# Patient Record
Sex: MALE | Race: WHITE | NOT HISPANIC OR LATINO | Employment: STUDENT | URBAN - METROPOLITAN AREA
[De-identification: names, ages, dates, MRNs, and addresses within clinical notes are randomized per-mention and may not be internally consistent; named-entity substitution may affect disease eponyms.]

---

## 2019-08-08 ENCOUNTER — HOSPITAL ENCOUNTER (OUTPATIENT)
Dept: CARDIOLOGY | Facility: MEDICAL CENTER | Age: 19
End: 2019-08-08
Attending: FAMILY MEDICINE
Payer: COMMERCIAL

## 2019-08-08 DIAGNOSIS — I47.10 SVT (SUPRAVENTRICULAR TACHYCARDIA): ICD-10-CM

## 2019-08-08 LAB
LV EJECT FRACT  99904: 65
LV EJECT FRACT MOD 2C 99903: 72.25
LV EJECT FRACT MOD 4C 99902: 64.3
LV EJECT FRACT MOD BP 99901: 67.08

## 2019-08-08 PROCEDURE — 93306 TTE W/DOPPLER COMPLETE: CPT

## 2019-08-08 PROCEDURE — 93306 TTE W/DOPPLER COMPLETE: CPT | Mod: 26 | Performed by: INTERNAL MEDICINE

## 2019-08-27 ENCOUNTER — OFFICE VISIT (OUTPATIENT)
Dept: CARDIOLOGY | Facility: MEDICAL CENTER | Age: 19
End: 2019-08-27
Payer: COMMERCIAL

## 2019-08-27 ENCOUNTER — TELEPHONE (OUTPATIENT)
Dept: CARDIOLOGY | Facility: MEDICAL CENTER | Age: 19
End: 2019-08-27

## 2019-08-27 VITALS
WEIGHT: 216 LBS | DIASTOLIC BLOOD PRESSURE: 82 MMHG | HEART RATE: 68 BPM | HEIGHT: 70 IN | BODY MASS INDEX: 30.92 KG/M2 | SYSTOLIC BLOOD PRESSURE: 122 MMHG | OXYGEN SATURATION: 98 %

## 2019-08-27 DIAGNOSIS — I47.10 SVT (SUPRAVENTRICULAR TACHYCARDIA): ICD-10-CM

## 2019-08-27 LAB — EKG IMPRESSION: NORMAL

## 2019-08-27 PROCEDURE — 99204 OFFICE O/P NEW MOD 45 MIN: CPT | Performed by: INTERNAL MEDICINE

## 2019-08-27 PROCEDURE — 93000 ELECTROCARDIOGRAM COMPLETE: CPT | Performed by: INTERNAL MEDICINE

## 2019-08-27 RX ORDER — AMOXICILLIN 500 MG/1
500 CAPSULE ORAL
Refills: 0 | COMMUNITY
Start: 2019-07-24 | End: 2019-08-27

## 2019-08-27 ASSESSMENT — ENCOUNTER SYMPTOMS
PALPITATIONS: 1
ABDOMINAL PAIN: 0
CHILLS: 0
COUGH: 0
VOMITING: 0
FEVER: 0
BLURRED VISION: 0
EYE DISCHARGE: 0
NERVOUS/ANXIOUS: 0
HEMOPTYSIS: 0
LOSS OF CONSCIOUSNESS: 0
EYE PAIN: 0
DEPRESSION: 0
BRUISES/BLEEDS EASILY: 0
SPEECH CHANGE: 0
WHEEZING: 0
NAUSEA: 0
MYALGIAS: 0

## 2019-08-27 NOTE — TELEPHONE ENCOUNTER
Dr Blake called. He thought pt was scheduled for procedure with EUGENIO Dominguez at [facility] today. Called [facility] and [facility] cath lab, pt is not on schedule, called [facility] card pt is not a pt in their system and has not been seen per the . Per Dr Blake, keep pt scheduled as is here for procedure.

## 2019-08-27 NOTE — PROGRESS NOTES
Chief Complaint   Patient presents with   • Supraventricular Tachycardia (SVT)     PP DX:SVT       Subjective:   Don Alejo is a 19 y.o. male who presents today seen in consult request of Dr Blake secondary to recurrent SVT 2 episodes in the past week.  Both occurring while practicing.  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  No family history of heart disease.  Normal echocardiogram.  No syncope.  Had rapid heart rate without exertion.  Did occur or start with exertion.  Does not want to take medications.  No episodes as a child.  EKG with possible mild preexcitation seen inferiorly.  Although not definitive.  No family history of sudden cardiac death or heart disease.  One sibling healthy patient is accompanied by his mother.  Currently on not on medications.  EKGs in the media.  Initial one broke with carotid sinus massage.  Did not have to go to the hospital.  No illicit drug use.  No smoking no alcohol.  Patient majoring in [employer] science    No past medical history on file.  No past surgical history on file.  No family history on file.  Social History     Socioeconomic History   • Marital status: Single     Spouse name: Not on file   • Number of children: Not on file   • Years of education: Not on file   • Highest education level: Not on file   Occupational History   • Not on file   Social Needs   • Financial resource strain: Not on file   • Food insecurity:     Worry: Not on file     Inability: Not on file   • Transportation needs:     Medical: Not on file     Non-medical: Not on file   Tobacco Use   • Smoking status: Not on file   Substance and Sexual Activity   • Alcohol use: Not on file   • Drug use: Not on file   • Sexual activity: Not on file   Lifestyle   • Physical activity:     Days per week: Not on file     Minutes per session: Not on file   • Stress: Not on file   Relationships   • Social connections:     Talks on phone: Not on file     Gets together: Not on file     Attends [employer]  "service: Not on file     Active member of club or organization: Not on file     Attends meetings of clubs or organizations: Not on file     Relationship status: Not on file   • Intimate partner violence:     Fear of current or ex partner: Not on file     Emotionally abused: Not on file     Physically abused: Not on file     Forced sexual activity: Not on file   Other Topics Concern   • Not on file   Social History Narrative   • Not on file     No Known Allergies  Outpatient Encounter Medications as of 8/27/2019   Medication Sig Dispense Refill   • [DISCONTINUED] amoxicillin (AMOXIL) 500 MG Cap Take 500 mg by mouth.  0     No facility-administered encounter medications on file as of 8/27/2019.      Review of Systems   Constitutional: Negative for chills and fever.   HENT: Negative for congestion.    Eyes: Negative for blurred vision, pain and discharge.   Respiratory: Negative for cough, hemoptysis and wheezing.    Cardiovascular: Positive for palpitations. Negative for chest pain.   Gastrointestinal: Negative for abdominal pain, nausea and vomiting.   Musculoskeletal: Negative for joint pain and myalgias.   Skin: Negative for itching and rash.   Neurological: Negative for speech change and loss of consciousness.   Endo/Heme/Allergies: Does not bruise/bleed easily.   Psychiatric/Behavioral: Negative for depression. The patient is not nervous/anxious.    All other systems reviewed and are negative.       Objective:   /82 (BP Location: Left arm, Patient Position: Sitting, BP Cuff Size: Adult)   Pulse 68   Ht 1.778 m (5' 10\")   Wt 98 kg (216 lb)   SpO2 98%   BMI 30.99 kg/m²     Physical Exam   Constitutional: He is oriented to person, place, and time. He appears well-developed and well-nourished.   HENT:   Head: Normocephalic and atraumatic.   Eyes: EOM are normal.   Neck: Normal range of motion. Neck supple.   Cardiovascular: Normal rate, regular rhythm and intact distal pulses. Exam reveals no gallop and no " friction rub.   No murmur heard.  Pulmonary/Chest: Effort normal and breath sounds normal.   Abdominal: Soft.   Musculoskeletal: Normal range of motion. He exhibits no edema.   Neurological: He is alert and oriented to person, place, and time.   Skin: Skin is warm and dry.   Psychiatric: He has a normal mood and affect. His behavior is normal. Judgment and thought content normal.       Assessment:     1. SVT (supraventricular tachycardia) ([facility])  EKG       Medical Decision Making:  Today's Assessment / Status / Plan:   1.  Recurrent SVT.  Not interested in medications.  Interested in ablation.  We will try to get this scheduled next week.  2.  Previous ACL repair.  The risks, benefits, and alternatives to SVT ablation were discussed in great detail. Specific risks mentioned include bleeding, infection, arteriovenous fistula related to sheath placement, cardiac perforation with possible tamponade requiring pericardiocentesis or possibly open heart surgery. In addition, pneumothorax and hemothorax were mentioned with right internal jugular venous access. I discussed the remote possibility of permanent pacemaker placement due to to inadvertent AV block. Lastly the risk of stroke was mentioned should left atrial ablation be required; in addition the risk of death and myocardial infarction were discussed. The patient vebalized understanding of these potential complications and wishes to proceed with the procedure. Success rates quoted for AVNRT at approximately 90%, WPW at 90%, atrial tachycardia at 85%.

## 2019-08-28 DIAGNOSIS — Z01.810 PRE-OPERATIVE CARDIOVASCULAR EXAMINATION: ICD-10-CM

## 2019-08-28 DIAGNOSIS — Z01.812 PRE-OPERATIVE LABORATORY EXAMINATION: ICD-10-CM

## 2019-08-28 LAB
ALBUMIN SERPL BCP-MCNC: 4.3 G/DL (ref 3.2–4.9)
ALBUMIN/GLOB SERPL: 1.4 G/DL
ALP SERPL-CCNC: 86 U/L (ref 30–99)
ALT SERPL-CCNC: 41 U/L (ref 2–50)
ANION GAP SERPL CALC-SCNC: 7 MMOL/L (ref 0–11.9)
AST SERPL-CCNC: 36 U/L (ref 12–45)
BASOPHILS # BLD AUTO: 0.4 % (ref 0–1.8)
BASOPHILS # BLD: 0.02 K/UL (ref 0–0.12)
BILIRUB SERPL-MCNC: 0.5 MG/DL (ref 0.1–1.5)
BUN SERPL-MCNC: 14 MG/DL (ref 8–22)
CALCIUM SERPL-MCNC: 9.8 MG/DL (ref 8.5–10.5)
CHLORIDE SERPL-SCNC: 103 MMOL/L (ref 96–112)
CO2 SERPL-SCNC: 27 MMOL/L (ref 20–33)
CREAT SERPL-MCNC: 1.21 MG/DL (ref 0.5–1.4)
EKG IMPRESSION: NORMAL
EOSINOPHIL # BLD AUTO: 0.06 K/UL (ref 0–0.51)
EOSINOPHIL NFR BLD: 1.1 % (ref 0–6.9)
ERYTHROCYTE [DISTWIDTH] IN BLOOD BY AUTOMATED COUNT: 43.8 FL (ref 35.9–50)
GLOBULIN SER CALC-MCNC: 3 G/DL (ref 1.9–3.5)
GLUCOSE SERPL-MCNC: 84 MG/DL (ref 65–99)
HCT VFR BLD AUTO: 49.3 % (ref 42–52)
HGB BLD-MCNC: 15.7 G/DL (ref 14–18)
IMM GRANULOCYTES # BLD AUTO: 0.02 K/UL (ref 0–0.11)
IMM GRANULOCYTES NFR BLD AUTO: 0.4 % (ref 0–0.9)
INR PPP: 0.99 (ref 0.87–1.13)
LYMPHOCYTES # BLD AUTO: 1.84 K/UL (ref 1–4.8)
LYMPHOCYTES NFR BLD: 33 % (ref 22–41)
MCH RBC QN AUTO: 26.6 PG (ref 27–33)
MCHC RBC AUTO-ENTMCNC: 31.8 G/DL (ref 33.7–35.3)
MCV RBC AUTO: 83.4 FL (ref 81.4–97.8)
MONOCYTES # BLD AUTO: 0.59 K/UL (ref 0–0.85)
MONOCYTES NFR BLD AUTO: 10.6 % (ref 0–13.4)
NEUTROPHILS # BLD AUTO: 3.04 K/UL (ref 1.82–7.42)
NEUTROPHILS NFR BLD: 54.5 % (ref 44–72)
NRBC # BLD AUTO: 0 K/UL
NRBC BLD-RTO: 0 /100 WBC
PLATELET # BLD AUTO: 261 K/UL (ref 164–446)
PMV BLD AUTO: 9.5 FL (ref 9–12.9)
POTASSIUM SERPL-SCNC: 4.3 MMOL/L (ref 3.6–5.5)
PROT SERPL-MCNC: 7.3 G/DL (ref 6–8.2)
PROTHROMBIN TIME: 13.3 SEC (ref 12–14.6)
RBC # BLD AUTO: 5.91 M/UL (ref 4.7–6.1)
SODIUM SERPL-SCNC: 137 MMOL/L (ref 135–145)
TSH SERPL DL<=0.005 MIU/L-ACNC: 1.21 UIU/ML (ref 0.38–5.33)
WBC # BLD AUTO: 5.6 K/UL (ref 4.8–10.8)

## 2019-08-28 PROCEDURE — 84443 ASSAY THYROID STIM HORMONE: CPT

## 2019-08-28 PROCEDURE — 85610 PROTHROMBIN TIME: CPT

## 2019-08-28 PROCEDURE — 36415 COLL VENOUS BLD VENIPUNCTURE: CPT

## 2019-08-28 PROCEDURE — 85025 COMPLETE CBC W/AUTO DIFF WBC: CPT

## 2019-08-28 PROCEDURE — 93005 ELECTROCARDIOGRAM TRACING: CPT | Performed by: INTERNAL MEDICINE

## 2019-08-28 PROCEDURE — 80053 COMPREHEN METABOLIC PANEL: CPT

## 2019-08-28 RX ORDER — IBUPROFEN 200 MG
400 TABLET ORAL
COMMUNITY

## 2019-08-29 ENCOUNTER — APPOINTMENT (OUTPATIENT)
Dept: CARDIOLOGY | Facility: MEDICAL CENTER | Age: 19
End: 2019-08-29
Attending: INTERNAL MEDICINE
Payer: COMMERCIAL

## 2019-08-29 ENCOUNTER — HOSPITAL ENCOUNTER (OUTPATIENT)
Facility: MEDICAL CENTER | Age: 19
End: 2019-08-29
Attending: INTERNAL MEDICINE | Admitting: INTERNAL MEDICINE
Payer: COMMERCIAL

## 2019-08-29 VITALS
WEIGHT: 217.59 LBS | RESPIRATION RATE: 15 BRPM | TEMPERATURE: 97.1 F | HEART RATE: 60 BPM | DIASTOLIC BLOOD PRESSURE: 87 MMHG | OXYGEN SATURATION: 97 % | BODY MASS INDEX: 31.15 KG/M2 | SYSTOLIC BLOOD PRESSURE: 159 MMHG | HEIGHT: 70 IN

## 2019-08-29 DIAGNOSIS — I47.10 SVT (SUPRAVENTRICULAR TACHYCARDIA): ICD-10-CM

## 2019-08-29 LAB — EKG IMPRESSION: NORMAL

## 2019-08-29 PROCEDURE — 700101 HCHG RX REV CODE 250

## 2019-08-29 PROCEDURE — 93613 INTRACARDIAC EPHYS 3D MAPG: CPT | Performed by: INTERNAL MEDICINE

## 2019-08-29 PROCEDURE — 93621 COMP EP EVL L PAC&REC C SINS: CPT | Mod: 26 | Performed by: INTERNAL MEDICINE

## 2019-08-29 PROCEDURE — 93010 ELECTROCARDIOGRAM REPORT: CPT | Performed by: INTERNAL MEDICINE

## 2019-08-29 PROCEDURE — 700111 HCHG RX REV CODE 636 W/ 250 OVERRIDE (IP)

## 2019-08-29 PROCEDURE — 93005 ELECTROCARDIOGRAM TRACING: CPT | Performed by: INTERNAL MEDICINE

## 2019-08-29 PROCEDURE — 93623 PRGRMD STIMJ&PACG IV RX NFS: CPT | Mod: 26 | Performed by: INTERNAL MEDICINE

## 2019-08-29 PROCEDURE — 160002 HCHG RECOVERY MINUTES (STAT)

## 2019-08-29 PROCEDURE — 93653 COMPRE EP EVAL TX SVT: CPT | Performed by: INTERNAL MEDICINE

## 2019-08-29 PROCEDURE — 99153 MOD SED SAME PHYS/QHP EA: CPT

## 2019-08-29 PROCEDURE — 99152 MOD SED SAME PHYS/QHP 5/>YRS: CPT | Performed by: INTERNAL MEDICINE

## 2019-08-29 RX ORDER — HEPARIN SODIUM 200 [USP'U]/100ML
INJECTION, SOLUTION INTRAVENOUS
Status: COMPLETED
Start: 2019-08-29 | End: 2019-08-29

## 2019-08-29 RX ORDER — MIDAZOLAM HYDROCHLORIDE 1 MG/ML
INJECTION INTRAMUSCULAR; INTRAVENOUS
Status: COMPLETED
Start: 2019-08-29 | End: 2019-08-29

## 2019-08-29 RX ORDER — HEPARIN SODIUM,PORCINE 1000/ML
VIAL (ML) INJECTION
Status: COMPLETED
Start: 2019-08-29 | End: 2019-08-29

## 2019-08-29 RX ORDER — DIPHENHYDRAMINE HYDROCHLORIDE 50 MG/ML
INJECTION INTRAMUSCULAR; INTRAVENOUS
Status: COMPLETED
Start: 2019-08-29 | End: 2019-08-29

## 2019-08-29 RX ORDER — LIDOCAINE HYDROCHLORIDE 20 MG/ML
INJECTION, SOLUTION INFILTRATION; PERINEURAL
Status: COMPLETED
Start: 2019-08-29 | End: 2019-08-29

## 2019-08-29 RX ORDER — ISOPROTERENOL HYDROCHLORIDE 0.2 MG/ML
INJECTION, SOLUTION INTRAVENOUS
Status: COMPLETED
Start: 2019-08-29 | End: 2019-08-29

## 2019-08-29 RX ORDER — ACETAMINOPHEN 325 MG/1
650 TABLET ORAL EVERY 4 HOURS PRN
Status: DISCONTINUED | OUTPATIENT
Start: 2019-08-29 | End: 2019-08-29 | Stop reason: HOSPADM

## 2019-08-29 RX ADMIN — ISOPROTERENOL HYDROCHLORIDE 200 MCG: 0.2 INJECTION, SOLUTION INTRACARDIAC; INTRAMUSCULAR; INTRAVENOUS; SUBCUTANEOUS at 08:57

## 2019-08-29 RX ADMIN — MIDAZOLAM HYDROCHLORIDE 4 MG: 1 INJECTION, SOLUTION INTRAMUSCULAR; INTRAVENOUS at 08:46

## 2019-08-29 RX ADMIN — FENTANYL CITRATE 100 MCG: 50 INJECTION, SOLUTION INTRAMUSCULAR; INTRAVENOUS at 10:01

## 2019-08-29 RX ADMIN — HEPARIN SODIUM 4000 UNITS: 1000 INJECTION, SOLUTION INTRAVENOUS; SUBCUTANEOUS at 08:32

## 2019-08-29 RX ADMIN — LIDOCAINE HYDROCHLORIDE: 20 INJECTION, SOLUTION INFILTRATION; PERINEURAL at 08:15

## 2019-08-29 RX ADMIN — MIDAZOLAM HYDROCHLORIDE 2 MG: 1 INJECTION, SOLUTION INTRAMUSCULAR; INTRAVENOUS at 10:01

## 2019-08-29 RX ADMIN — FENTANYL CITRATE 100 MCG: 50 INJECTION, SOLUTION INTRAMUSCULAR; INTRAVENOUS at 08:46

## 2019-08-29 NOTE — DISCHARGE INSTRUCTIONS
ACTIVITY: Rest and take it easy for the first 24 hours.  A responsible adult is recommended to remain with you during that time.  It is normal to feel sleepy.  We encourage you to not do anything that requires balance, judgment or coordination.    MILD FLU-LIKE SYMPTOMS ARE NORMAL. YOU MAY EXPERIENCE GENERALIZED MUSCLE ACHES, THROAT IRRITATION, HEADACHE AND/OR SOME NAUSEA.    FOR 24 HOURS DO NOT:  Drive, operate machinery or run household appliances.  Drink beer or alcoholic beverages.   Make important decisions or sign legal documents.    SPECIAL INSTRUCTIONS: follow up with primary care physician as needed  If you experience chest pain, shortness of breath call 911  Return to ER  Follow up with primary care physician as needed.    DIET: To avoid nausea, slowly advance diet as tolerated, avoiding spicy or greasy foods for the first day.  Add more substantial food to your diet according to your physician's instructions.  Babies can be fed formula or breast milk as soon as they are hungry.  INCREASE FLUIDS AND FIBER TO AVOID CONSTIPATION.    SURGICAL DRESSING/BATHING: keep dressing clean dry intact for 24 hours, remove dressing after 24 hours.     FOLLOW-UP APPOINTMENT:  A follow-up appointment should be arranged with your doctor in [phone]; call to schedule.    You should CALL YOUR PHYSICIAN if you develop:  Fever greater than 101 degrees F.  Pain not relieved by medication, or persistent nausea or vomiting.  Excessive bleeding (blood soaking through dressing) or unexpected drainage from the wound.  Extreme redness or swelling around the incision site, drainage of pus or foul smelling drainage.  Inability to urinate or empty your bladder within 8 hours.  Problems with breathing or chest pain.    You should call 911 if you develop problems with breathing or chest pain.  If you are unable to contact your doctor or surgical center, you should go to the nearest emergency room or urgent care center.  Physician's  telephone #: [phone]    If any questions arise, call your doctor.  If your doctor is not available, please feel free to call the Surgical Center at [phone].  The Center is open Monday through Friday from 7AM to 7PM.  You can also call the HEALTH HOTLINE open 24 hours/day, 7 days/week and speak to a nurse at [phone], or toll free at [phone].    A registered nurse may call you a few days after your surgery to see how you are doing after your procedure.    MEDICATIONS: Resume taking daily medication.  Take prescribed pain medication with food.  If no medication is prescribed, you may take non-aspirin pain medication if needed.  PAIN MEDICATION CAN BE VERY CONSTIPATING.  Take a stool softener or laxative such as senokot, pericolace, or milk of magnesia if needed.    If your physician has prescribed pain medication that includes Acetaminophen (Tylenol), do not take additional Acetaminophen (Tylenol) while taking the prescribed medication.    Depression / Suicide Risk    As you are discharged from this [facility] facility, it is important to learn how to keep safe from harming yourself.    Recognize the warning signs:  · Abrupt changes in personality, positive or negative- including increase in energy   · Giving away possessions  · Change in eating patterns- significant weight changes-  positive or negative  · Change in sleeping patterns- unable to sleep or sleeping all the time   · Unwillingness or inability to communicate  · Depression  · Unusual sadness, discouragement and loneliness  · Talk of wanting to die  · Neglect of personal appearance   · Rebelliousness- reckless behavior  · Withdrawal from people/activities they love  · Confusion- inability to concentrate     If you or a loved one observes any of these behaviors or has concerns about self-harm, here's what you can do:  · Talk about it- your feelings and reasons for harming yourself  · Remove any means that you might use to hurt yourself  "(examples: pills, rope, extension cords, firearm)  · Get professional help from the community (Mental Health, Substance Abuse, psychological counseling)  · Do not be alone:Call your Safe Contact- someone whom you trust who will be there for you.  · Call your local CRISIS HOTLINE 139-8839 or 243-206-7913  · Call your local Children's Mobile Crisis Response Team [place] (043) 465-7540 or www.[employer]  · Call the toll free National Suicide Prevention Hotlines   · National Suicide Prevention Lifeline 137-567-WUXD (7095)  [employer] Line Network 800-SUICIDE (806-4819)  Post Angiogram Groin Care Instructions     INSTRUCTIONS  2. Examine (look and feel) the site of your incision site TODAY so you can recognize changes that should be called to your doctor (see below).  3. Avoid straining either by lifting or pulling objects for 4-5 days. Avoid lifting over 5 pounds.   4. For at least 72 hours, if you should sneeze or cough, please hold pressure over your groin area.  5. If you should begin to have oozing from the catheterization site, please hold firm pressure and call your doctor's office immediately.  6. If profuse bleeding occurs from the catheterization site, hold firm pressure and call \"[phone]\" immediately for assistance.  7. Remove bandage after 24 hours.     ACTIVITY  2. Limit activity as instructed by your doctor.  3. No driving or very limited driving with frequent stops for one week.   4. If you must take a long car ride, stop every hour and walk around the car.   5. Warm showers or baths are permitted after the bandage is removed. Avoid hot showers, baths, hot tubs, and swimming for one week.    PLEASE CALL YOUR DOCTOR IF:  1. Temperature elevation occurs.  2. Catheterization site becomes reddened or begins to drain.   3. Bruising appears to be new or not resolving. The bruise may move down your leg. This is normal.  4. The small round lump in the groin increases in size.  5. Any leg numbness, " aching, or discomfort (immediately).  6. Increasing discomfort in the leg at the insertion site.  7. Chest pains, even if relieved by Nitroglycerin.    MISCELLANEOUS INSTRUCTIONS  1. Bruising may occur as a result of heart catheterization. Some of the discoloration may travel down the leg, going from blue to green in color.  2. A small round lump under the catheterization site will remain for up to six weeks.  3. If any questions arise call your physician's office. You can also call the HEALTH HOTLINE open 24 hours/day, 7 days/week and speak to a nurse at [phone], or toll free at [phone].   4. You should call 911 if you develop problems with breathing or chest pain.    FOR PROBLEMS CALL ENMA Frye  AT: [phone]    I acknowledge receipt and understanding of these Home Care instructions.  Understanding Supraventricular Tachycardia  Supraventricular tachycardia (SVT) is a type of abnormal heart rhythm that results in a fast heartbeat. It is caused by a problem in the electrical system of the heart The word supraventricular means above the ventricles. With SVT, the abnormal rhythm starts in the upper heart chambers (atria).   The heart normally beats roughly 60 to 100 beats per minute while you are at rest and awake. With SVT, the heart beats more than 100 times a minute. It may even beat over 200 times a minute. Because the heart is beating so fast, the chambers of the heart don't have enough time to fully fill. So less blood is pumped through the heart.  How the heart beats  A heartbeat is the rhythm of the heart as it contracts to squeeze blood through the body. It is driven by electrical signals in the heart. A beat normally starts when a special group of cells give off an electrical signal. These cells are in the upper right chamber of your heart (right atrium). They are called the sinoatrial (SA) node. The signal from the SA node travels down the atria to the atrioventricular (AV) node. This is another  "special group of cells between the atria and ventricles. The AV node serves as a  for signals passing through the heart to your lower chambers. From the AV node, the signal travels to your left and right ventricles. As the electrical signal travels along this pathway, it tells nearby parts of your heart to contract. This causes your heart to pump in a set way.  What is SVT?  When you have SVT, the signal to start your heartbeat doesn’t come from the SA node. Instead it comes from another part of the left or right atrium. Or it may come from the AV node. In SVT, the signal begins to fire quickly. This causes a rapid heartbeat of over 100 beats per minute. In some cases, the electrical signals are caught up in a looping circuit. This rapid beating shortens the time your ventricles have to fill. If your heartbeat is fast enough, your heart may not be able to pump enough blood to the rest of your body. As a result, you may feel many symptoms linked to not getting enough blood flow. The problem heartbeat may last for a few seconds to a few hours. Then your heart returns to its normal rhythm. Some SVT rhythms can last for days or weeks. Some even become life long (permanent).  Types of SVT  There are several types of SVT. They include:   · Atrial fibrillation (AFib). This is the most common type of SVT. The upper chambers of the heart quiver very fast instead of pumping. This is caused by electrical problems in the atria. The contractions of the heart are very irregular.  · Atrial flutter. This type of SVT is a milder form of AFib. The upper chambers of the heart flutter instead of pumping normally. This may cause a regular or irregular pulse.   · Atrioventricular ld re-entrant tachycardia. It occurs when you have two channels instead of one through the AV node. The signal goes down one channel and up the other. So the signal \"re-enters\" the atria. This causes extra contractions. This causes fast heart " rate.  · Atrioventricular reciprocating tachycardia. With this condition, there is an extra connection of muscle between the atrium and the ventricle. This extra connection is known as an accessory pathway. It can conduct electricity both upward and downward. The signal starts down the normal pathway through the AV node. But then it goes back up to the atrium through the accessory pathway. The signal then goes down the AV node again. This circular pattern leads to an abnormal heart rhythm. This type of SVT is generally not serious (benign). In rare cases, it may lead to an abnormal heart rhythm that may cause sudden death. This type of SVT is from a problem you were born with (congenital).  · Atrial tachycardia. This is another common type of SVT. A small group of cells in the atria begin to fire abnormally and compete with the sinus node. This starts an abnormally fast heartbeat.  · Multifocal atrial tachycardia. With this type of SVT, several groups of cells in your atria fire abnormally and trigger a fast heartbeat.  What causes SVT?  Some types of SVT run in families. Some people have heart problems from birth that cause SVT. High blood pressure, heart failure, mitral valve disease, sleep apnea, thyroid problems, and heart attacks can also cause SVT. Smoking, excess caffeine or alcohol, and some medicines can raise your risk for SVT.  Symptoms of SVT  When SVT happens, you may feel no symptoms. Or you may have:  · Fluttering feelings in your chest (palpitations)  · A tight feeling or pain in your chest  · A pulsing feeling in your neck  · Dizziness  · Shortness of breath  · Tiredness  · Fainting  · Nausea  In very rare cases, SVT can cause sudden death.  Diagnosing SVT  Your main healthcare provider may diagnose you. Or you may see a healthcare provider who specializes in heart conditions (cardiologist). The provider will ask about your health history. They will also give you a physical exam and do one or more  tests. These tests can help show what kind of SVT you have, and what may be causing it. They also help check for other problems. The tests may include:  · Electrocardiogram (ECG), to look at the abnormal rhythm  · Continuous heart monitors such as a Holter monitor or an event recorder. This is to watch your heart rhythm over a longer period of time to catch the SVT rhythm.  · Blood tests, to look for causes such as thyroid problems or electrolyte problems  · Chest X-ray, to check for lung problems and look at the size of your heart  · Exercise stress test, to see how well your heart works under stress  · Echocardiography (echo), to check your heart structure and function  · Electrophysiology studies (EPS). This is an invasive procedure using a thin tube (catheter) put into a blood vessel to the heart. This test checks the heart's electrical signals and diagnoses the SVT.  Treating SVT  Treatment for SVT will depend on the length of time you have had it. It will also depend on how often you have it and how serious it is. Treatments may include:  · Vagal maneuvers  · Medicines to slow your heart rate  · Electrical shock to get your heart back into rhythm (cardioversion)  · Catheter ablation  · Blood thinners to prevent stroke. This depends on the type of SVT you have.  If you develop SVT, talk with your healthcare provider about the best treatment options for you.  Discharge Instructions for Catheter Ablation  You have had a procedure called catheter ablation. It was used to treat an abnormal heartbeat (arrhythmia). This procedure destroyed (ablated) the cells in your heart that were causing your heart rhythm problem. During the procedure, the healthcare provider put a thin, flexible wire (catheter) into a blood vessel in your groin. You may have also had a catheter placed through a vein in your neck. The provider then threaded the catheter to your heart and destroyed the cells causing the problem.   Home care  Here  are recommendations for care at home:   · Make arrangements for someone to drive you home after the procedure because you had medicine to relax you (sedation) Your healthcare provider may tell you not to drive for 24 to 48 hours after the procedure.  · Expect to be able to go back to your normal daily activities in the next 1 to 2 days. These include walking, climbing stairs, and doing household chores.  · Don't do any heavy physical activity or excessive bending at the waist for several days after the procedure. This will allow your body to heal.  · Don't lift heavy objects for a period of time after your ablation. Talk with your healthcare provider about any restrictions.   · Ask your healthcare provider when you can return to work.  · Check the area where the catheter was inserted for signs of infection every day for a week. Signs of infection include redness, swelling, drainage, or warmth at the incision site. It is normal to have a small bruise or lump where the catheter was inserted. Take your temperature if you feel you may have a fever  · Take your medicines exactly as directed. Don’t skip doses. You may need to make some changes in your medicines because of the ablation procedure. Be sure to go over your medicine instructions with your healthcare provider before you are discharged.  · Learn to take your own pulse. Keep a record of your results. Ask your healthcare provider which readings mean that you need medical attention.  Follow-up care  Make a follow-up appointment as directed by your healthcare provider. Your provider will check how the catheter site is healing. In many cases, one ablation is enough to treat an arrhythmia. But sometimes the problem comes back or another problem is found. If this happens, you may need a second procedure.  When to call your healthcare provider  Call your healthcare provider right away if you have any of the following:  · Redness, pain, swelling, bleeding, or drainage  from the area where the catheter was put in  · Chest pain, shortness of breath, or dizziness  · Temperature of 100.4°F (38.0°C) or higher, or as directed by your healthcare provider   · Sudden coldness, pain, or numbness in the leg or arm where the catheter was put in   · Nausea or vomiting  · Difficulty swallowing, excessive pain when swallowing, or vomiting blood  Note: Ask your healthcare provider what to expect about your heartbeat. Sometimes the irregularity goes away right after the procedure. Other times it may take longer to go away.    © 5007-4534 [employer]. [address] [place] [address]. All rights reserved. This information is not intended as a substitute for professional medical care. Always follow your healthcare professional's instructions.             Treatment for Supraventricular Tachycardia  Supraventricular tachycardia (SVT) is a class of abnormally fast heart rhythms that originate from the top chambers of the heart called the atria. The normal heart rhythm is generated by your sinus node and the electricity is conducted through the heart over specialized nerves.  When the electricity meets the muscle cells this results in regular and coordinated heart beats. During SVT, the heart rhythm may be generated by an abnormal area of excited heart muscle in the atria or by an abnormal electric circuit that has formed in the heart leading to rapid electrical activity. This can results in symptoms of palpitations, shortness of breath, chest pain, dizziness, or fainting.  Types of treatment  You may not need treatment for SVT if you have only rare episodes. If you do need treatment, there are several kinds. They include:  · Valsalva maneuver. This is a way to increase pressure in the abdomen and chest. It can correct your heart rhythm right away. To do it, you bear down with your stomach muscles, as though you are trying to have a bowel movement.  · Carotid massage. Your  healthcare provider may rub the carotid artery in your neck. This produces a slowing heart rate reflex in the heart and can sometimes stop the arrhythmia.  · Medicine. There are various kinds you can take. Calcium channel or beta blockers can help correct heart rhythm. If you have SVT only 1 or 2 times a year, you may take beta-blockers or calcium channel medicines by mouth (orally) as needed. If your SVT is more frequent, you may need to take medicine every day. Some people may need to take several medicines to prevent episodes of SVT. For emergent cases, calcium channel or beta blockers can be given through IV (intravenously) for more rapid correction of the heart rhythm. Adenosine is another medicince that can be given through IV as well that can work in a matter of seconds. It is not available in an oral form.  · Electrocardioversion. This is a shock to the heart to restart a normal rhythm right away. This may be done if you have a severe episode of SVT.  · Catheter ablation. This can help cure SVT. Your healthcare provider puts a thin, flexible tube (catheter) into a blood vessel in the groin. He or she then gently pushes it up into your heart. The area of your heart that causes your SVT is then either cauterized with heat or scarred with freezing energy. This prevents that area from starting a signal that causes SVT. An ablation may need to be repeated if a new excited nerve area in the atria develops and the SVT returns.  Lifestyle changes to help prevent SVT episodes  Your healthcare provider might suggest other ways to help prevent SVT, such as the following:  · Have less alcohol and caffeine  · Don't smoke  · Lower your stress  · Eat foods that are healthy for your heart  · Don't take recreational drugs, especially stimulants that can over-excite the heart muscle. Some herbs and supplements can have this same effect. Always check with your healthcare team before you take any non-prescribed  medicines.  · Stay well hydrated and get enough sleep  Call 911  Call 911 right away if you have:  · Sudden shortness of breath  When to call your healthcare provider  Call your healthcare provider if you have any of the following:  · Severe palpitations  · Severe dizziness or fainting  · Severe chest pain  · Symptoms that are happening more often

## 2019-08-29 NOTE — OP REPORT
[facility] EP PROCEDURE NOTE    Procedure(s) Performed: Supraventricular tachycardia ablation with Electrophysiology Study, Electroanatomical 3D mapping, CS/LA pace and record, programmed stimulation after IV drug infusion.     Indications: PSVT refractory or not able to tolerate medical therapy     Physician(s): Anayeli Frye M.D.     Resident/Assistant(s): None     Anesthesia: Local and moderate sedation (start time 0845, stop time 1000, total dose given 200 mcg IV fentanyl, 8 mg IV versed)     Specimen(s) Removed: None     Estimated Blood Loss:  20cc     Complications:  None     Procedure:     After informed written consent, the patient was brought to the EP lab in the fasting, non-sedated state. The patient was prepped and draped in the usual sterile fashion. Femoral venous access was obtained using the modified Seldinger technique. In the left femoral vein, 2 sheaths (6, 8 Fr) were inserted over 0.035” guidewires. In the right femoral vein, 2 sheaths (6, 6 Fr) were inserted over 0.035” guidewires. A quadrapolar catheter was advanced to the His position, A quadrapolar catheter was advanced to the RAA position, A quadrapolar catheter was advanced to the RVA position. A deflectable decapolar catheter was advanced to the CS position. Programmed stimulation of the right atrium, coronary sinus, and right ventricle was performed to induce arrhythmia. One 8Fr irrigated ablation catheter with 3.5 mm distal electrode (Bisoense EZ Steer) was inserted into an 8Fr sheath (SR0) for electroanatomical 3D mapping (CARTO3) and radiofrequency ablation. At the end of the procedure, the catheter and sheaths were removed, and hemostasis was achieved by manual compression. Following recovery from anesthesia, the patient was transferred to the PPU in good condition.     Baseline Rhythm: sinus CL 1022 ms,  ms HV 49 ms. No pre-excitation.     AV Leida Function:  Wenckebach AV  ms   Initially no VA conduction, on isuprel  infusion at 2 mcg/min, VA conduction resumed with VABCL of 300 ms     Arrhythmias:  1. During infusion of isoproterenol 2mcg/min, atrial pacing induced typical (slow/fast) AV ld reentrant tachycardia:     ms, HA<AH  Entrainment from the RVA showed VAHV response consistent with AVNRT     Mapping:  Electroanatomical 3D (CARTO3) map was created of right atrium and coronary sinus created during sinus rhythm to alma the position of the His, CS ostium, and the posteroseptal tricuspid annulus.      Ablation:  Radiofrequency energy was applied using 3.5 irrigated catheter, power 30 W, total 7 RF applications, RF time 171 seconds at the anatomic site of the slow pathway anterior to the CS ostium and just posterior to the septal tricuspid annulus, targeting ASP potential. Automaticity (accelerated junctional rhythm) with persistent retrograde fast pathway conduction to the atrium was seen during ablation.    Post Ablation Testin. No inducible AVNRT in baseline state or during Isuprel 4 mcg/min with single, double, and triple extra atrial stimuli pacing from HRA    Fluroscopy time: 4.2 minutes     Impressions:    1. Typical (slow/fast) AV ld reentrant tachycardia.  2. Successful catheter mediated ablation of slow AV ld pathway with elimination of AVNRT.     Plan:   1. Admit to monitored bed  2. Bedrest x 4 hours

## 2019-08-29 NOTE — OR NURSING
1020 patient arrived from cath lab s/p SVT ablation. Patient fully awake, denies pain, denies nausea, right and left groin access sites dressing clean dry intact and soft. Vss. SR 70s.  1130 patient provided with water tolerating well.   1220 left groin oozing. Applied sandbag. Oozing stopped.   1300 surgical site dressings clean dry intact and soft.   1400 patient ambulated in the hallway independently tolerated well. Surgical site dressings clean.   1405 Criteria met to discharge patient home.   1410 discharge instructions given to patient and family. Both patient and family verbalize understanding of the orders, reviewed activity, worsening symptoms, follow up, medications.   1415 patient escorted via w/c with all his personal belongings.

## 2019-09-10 ENCOUNTER — APPOINTMENT (OUTPATIENT)
Dept: ADMISSIONS | Facility: MEDICAL CENTER | Age: 19
End: 2019-09-10
Attending: ORTHOPAEDIC SURGERY
Payer: COMMERCIAL

## 2019-09-10 NOTE — OR NURSING
On Sep 10, 2019, at 14:20, Melissa <[email or web address] wrote: to Dr. Robertson:     Good afternoon  Don Alejo was added on this afternoon for surgery tomorrow with Dr. Ramesh for Right small finger central slip repair.  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  I have not been able to reach the patient yet, but wanted to give you a heads up if there is anything else needed     The question to ask is whether this case can be done with sedation/local.  We will need the information regarding the ablation as well.  Thank you.   Mireya Robertson M.D.  [facility]      Flora Vides at Dr. Ramesh's office, left message regarding above info (recent SVT/ ablation and Dr. Robertson's request)  Cardiac Ablation information and cardiac notes attached to chart.   t

## 2019-09-10 NOTE — OR NURSING
"Preadmit appointment: \" Preparing for your Procedure information\" sheet given to patient with verbal and written instructions. Patient instructed to continue prescribed medications through the day before surgery, instructed to take the following medications the day of surgery per anesthesia protocol: none pt denies issues with anesthesia  "

## 2019-09-11 ENCOUNTER — ANESTHESIA (OUTPATIENT)
Dept: SURGERY | Facility: MEDICAL CENTER | Age: 19
End: 2019-09-11
Payer: COMMERCIAL

## 2019-09-11 ENCOUNTER — APPOINTMENT (OUTPATIENT)
Dept: RADIOLOGY | Facility: MEDICAL CENTER | Age: 19
End: 2019-09-11
Attending: ORTHOPAEDIC SURGERY
Payer: COMMERCIAL

## 2019-09-11 ENCOUNTER — HOSPITAL ENCOUNTER (OUTPATIENT)
Facility: MEDICAL CENTER | Age: 19
End: 2019-09-11
Attending: ORTHOPAEDIC SURGERY | Admitting: ORTHOPAEDIC SURGERY
Payer: COMMERCIAL

## 2019-09-11 ENCOUNTER — ANESTHESIA EVENT (OUTPATIENT)
Dept: SURGERY | Facility: MEDICAL CENTER | Age: 19
End: 2019-09-11
Payer: COMMERCIAL

## 2019-09-11 VITALS
OXYGEN SATURATION: 94 % | RESPIRATION RATE: 16 BRPM | HEART RATE: 65 BPM | DIASTOLIC BLOOD PRESSURE: 92 MMHG | BODY MASS INDEX: 30.93 KG/M2 | TEMPERATURE: 98.1 F | WEIGHT: 216.05 LBS | SYSTOLIC BLOOD PRESSURE: 158 MMHG | HEIGHT: 70 IN

## 2019-09-11 DIAGNOSIS — M20.021 CENTRAL SLIP EXTENSOR TENDON INJURY (BOUTONNIERE), RIGHT: ICD-10-CM

## 2019-09-11 PROCEDURE — 502576 HCHG PACK, HAND: Performed by: ORTHOPAEDIC SURGERY

## 2019-09-11 PROCEDURE — 160039 HCHG SURGERY MINUTES - EA ADDL 1 MIN LEVEL 3: Performed by: ORTHOPAEDIC SURGERY

## 2019-09-11 PROCEDURE — A9270 NON-COVERED ITEM OR SERVICE: HCPCS | Performed by: ANESTHESIOLOGY

## 2019-09-11 PROCEDURE — 700111 HCHG RX REV CODE 636 W/ 250 OVERRIDE (IP): Performed by: ANESTHESIOLOGY

## 2019-09-11 PROCEDURE — 160009 HCHG ANES TIME/MIN: Performed by: ORTHOPAEDIC SURGERY

## 2019-09-11 PROCEDURE — 502000 HCHG MISC OR IMPLANTS RC 0278: Performed by: ORTHOPAEDIC SURGERY

## 2019-09-11 PROCEDURE — 160048 HCHG OR STATISTICAL LEVEL 1-5: Performed by: ORTHOPAEDIC SURGERY

## 2019-09-11 PROCEDURE — 160002 HCHG RECOVERY MINUTES (STAT): Performed by: ORTHOPAEDIC SURGERY

## 2019-09-11 PROCEDURE — 160028 HCHG SURGERY MINUTES - 1ST 30 MINS LEVEL 3: Performed by: ORTHOPAEDIC SURGERY

## 2019-09-11 PROCEDURE — 160046 HCHG PACU - 1ST 60 MINS PHASE II: Performed by: ORTHOPAEDIC SURGERY

## 2019-09-11 PROCEDURE — 700102 HCHG RX REV CODE 250 W/ 637 OVERRIDE(OP): Performed by: ANESTHESIOLOGY

## 2019-09-11 PROCEDURE — 700101 HCHG RX REV CODE 250: Performed by: ORTHOPAEDIC SURGERY

## 2019-09-11 PROCEDURE — A6222 GAUZE <=16 IN NO W/SAL W/O B: HCPCS | Performed by: ORTHOPAEDIC SURGERY

## 2019-09-11 PROCEDURE — 700105 HCHG RX REV CODE 258: Performed by: ORTHOPAEDIC SURGERY

## 2019-09-11 PROCEDURE — 160025 RECOVERY II MINUTES (STATS): Performed by: ORTHOPAEDIC SURGERY

## 2019-09-11 PROCEDURE — 501838 HCHG SUTURE GENERAL: Performed by: ORTHOPAEDIC SURGERY

## 2019-09-11 PROCEDURE — 160035 HCHG PACU - 1ST 60 MINS PHASE I: Performed by: ORTHOPAEDIC SURGERY

## 2019-09-11 DEVICE — IMPLANTABLE DEVICE: Type: IMPLANTABLE DEVICE | Status: FUNCTIONAL

## 2019-09-11 RX ORDER — KETOROLAC TROMETHAMINE 30 MG/ML
INJECTION, SOLUTION INTRAMUSCULAR; INTRAVENOUS PRN
Status: DISCONTINUED | OUTPATIENT
Start: 2019-09-11 | End: 2019-09-11 | Stop reason: SURG

## 2019-09-11 RX ORDER — ACETAMINOPHEN 500 MG
1000 TABLET ORAL ONCE
Status: COMPLETED | OUTPATIENT
Start: 2019-09-11 | End: 2019-09-11

## 2019-09-11 RX ORDER — GABAPENTIN 300 MG/1
300 CAPSULE ORAL ONCE
Status: COMPLETED | OUTPATIENT
Start: 2019-09-11 | End: 2019-09-11

## 2019-09-11 RX ORDER — ONDANSETRON 2 MG/ML
INJECTION INTRAMUSCULAR; INTRAVENOUS PRN
Status: DISCONTINUED | OUTPATIENT
Start: 2019-09-11 | End: 2019-09-11 | Stop reason: SURG

## 2019-09-11 RX ORDER — METOPROLOL TARTRATE 1 MG/ML
1 INJECTION, SOLUTION INTRAVENOUS
Status: DISCONTINUED | OUTPATIENT
Start: 2019-09-11 | End: 2019-09-11 | Stop reason: HOSPADM

## 2019-09-11 RX ORDER — BUPIVACAINE HYDROCHLORIDE 5 MG/ML
INJECTION, SOLUTION EPIDURAL; INTRACAUDAL
Status: DISCONTINUED | OUTPATIENT
Start: 2019-09-11 | End: 2019-09-11 | Stop reason: HOSPADM

## 2019-09-11 RX ORDER — LABETALOL HYDROCHLORIDE 5 MG/ML
5 INJECTION, SOLUTION INTRAVENOUS
Status: DISCONTINUED | OUTPATIENT
Start: 2019-09-11 | End: 2019-09-11 | Stop reason: HOSPADM

## 2019-09-11 RX ORDER — MIDAZOLAM HYDROCHLORIDE 1 MG/ML
1 INJECTION INTRAMUSCULAR; INTRAVENOUS
Status: DISCONTINUED | OUTPATIENT
Start: 2019-09-11 | End: 2019-09-11 | Stop reason: HOSPADM

## 2019-09-11 RX ORDER — SODIUM CHLORIDE, SODIUM LACTATE, POTASSIUM CHLORIDE, CALCIUM CHLORIDE 600; 310; 30; 20 MG/100ML; MG/100ML; MG/100ML; MG/100ML
INJECTION, SOLUTION INTRAVENOUS CONTINUOUS
Status: DISCONTINUED | OUTPATIENT
Start: 2019-09-11 | End: 2019-09-11 | Stop reason: HOSPADM

## 2019-09-11 RX ORDER — HYDROMORPHONE HYDROCHLORIDE 1 MG/ML
0.2 INJECTION, SOLUTION INTRAMUSCULAR; INTRAVENOUS; SUBCUTANEOUS
Status: DISCONTINUED | OUTPATIENT
Start: 2019-09-11 | End: 2019-09-11 | Stop reason: HOSPADM

## 2019-09-11 RX ORDER — HYDROMORPHONE HYDROCHLORIDE 1 MG/ML
0.1 INJECTION, SOLUTION INTRAMUSCULAR; INTRAVENOUS; SUBCUTANEOUS
Status: DISCONTINUED | OUTPATIENT
Start: 2019-09-11 | End: 2019-09-11 | Stop reason: HOSPADM

## 2019-09-11 RX ORDER — LIDOCAINE HYDROCHLORIDE AND EPINEPHRINE 10; 10 MG/ML; UG/ML
INJECTION, SOLUTION INFILTRATION; PERINEURAL
Status: DISCONTINUED | OUTPATIENT
Start: 2019-09-11 | End: 2019-09-11 | Stop reason: HOSPADM

## 2019-09-11 RX ORDER — OXYCODONE HCL 5 MG/5 ML
10 SOLUTION, ORAL ORAL
Status: DISCONTINUED | OUTPATIENT
Start: 2019-09-11 | End: 2019-09-11 | Stop reason: HOSPADM

## 2019-09-11 RX ORDER — ONDANSETRON 2 MG/ML
4 INJECTION INTRAMUSCULAR; INTRAVENOUS
Status: DISCONTINUED | OUTPATIENT
Start: 2019-09-11 | End: 2019-09-11 | Stop reason: HOSPADM

## 2019-09-11 RX ORDER — MEPERIDINE HYDROCHLORIDE 25 MG/ML
12.5 INJECTION INTRAMUSCULAR; INTRAVENOUS; SUBCUTANEOUS
Status: DISCONTINUED | OUTPATIENT
Start: 2019-09-11 | End: 2019-09-11 | Stop reason: HOSPADM

## 2019-09-11 RX ORDER — HYDRALAZINE HYDROCHLORIDE 20 MG/ML
5 INJECTION INTRAMUSCULAR; INTRAVENOUS
Status: DISCONTINUED | OUTPATIENT
Start: 2019-09-11 | End: 2019-09-11 | Stop reason: HOSPADM

## 2019-09-11 RX ORDER — OXYCODONE HCL 5 MG/5 ML
5 SOLUTION, ORAL ORAL
Status: DISCONTINUED | OUTPATIENT
Start: 2019-09-11 | End: 2019-09-11 | Stop reason: HOSPADM

## 2019-09-11 RX ORDER — CEFAZOLIN SODIUM 1 G/3ML
INJECTION, POWDER, FOR SOLUTION INTRAMUSCULAR; INTRAVENOUS PRN
Status: DISCONTINUED | OUTPATIENT
Start: 2019-09-11 | End: 2019-09-11 | Stop reason: SURG

## 2019-09-11 RX ORDER — HYDROMORPHONE HYDROCHLORIDE 1 MG/ML
0.4 INJECTION, SOLUTION INTRAMUSCULAR; INTRAVENOUS; SUBCUTANEOUS
Status: DISCONTINUED | OUTPATIENT
Start: 2019-09-11 | End: 2019-09-11 | Stop reason: HOSPADM

## 2019-09-11 RX ORDER — DIPHENHYDRAMINE HYDROCHLORIDE 50 MG/ML
12.5 INJECTION INTRAMUSCULAR; INTRAVENOUS
Status: DISCONTINUED | OUTPATIENT
Start: 2019-09-11 | End: 2019-09-11 | Stop reason: HOSPADM

## 2019-09-11 RX ORDER — HALOPERIDOL 5 MG/ML
1 INJECTION INTRAMUSCULAR
Status: DISCONTINUED | OUTPATIENT
Start: 2019-09-11 | End: 2019-09-11 | Stop reason: HOSPADM

## 2019-09-11 RX ORDER — TRAMADOL HYDROCHLORIDE 50 MG/1
50 TABLET ORAL EVERY 4 HOURS PRN
Qty: 17 TAB | Refills: 0 | Status: SHIPPED | OUTPATIENT
Start: 2019-09-11 | End: 2019-09-18

## 2019-09-11 RX ADMIN — MIDAZOLAM HYDROCHLORIDE 2 MG: 1 INJECTION, SOLUTION INTRAMUSCULAR; INTRAVENOUS at 14:58

## 2019-09-11 RX ADMIN — KETOROLAC TROMETHAMINE 30 MG: 30 INJECTION, SOLUTION INTRAMUSCULAR at 16:01

## 2019-09-11 RX ADMIN — ONDANSETRON 4 MG: 2 INJECTION INTRAMUSCULAR; INTRAVENOUS at 16:01

## 2019-09-11 RX ADMIN — FENTANYL CITRATE 100 MCG: 50 INJECTION, SOLUTION INTRAMUSCULAR; INTRAVENOUS at 15:05

## 2019-09-11 RX ADMIN — GABAPENTIN 300 MG: 300 CAPSULE ORAL at 14:00

## 2019-09-11 RX ADMIN — LIDOCAINE HYDROCHLORIDE 0.5 ML: 10 INJECTION, SOLUTION INFILTRATION; PERINEURAL at 13:39

## 2019-09-11 RX ADMIN — CEFAZOLIN 2 G: 1 INJECTION, POWDER, FOR SOLUTION INTRAVENOUS at 15:10

## 2019-09-11 RX ADMIN — PROPOFOL 200 MG: 10 INJECTION, EMULSION INTRAVENOUS at 15:05

## 2019-09-11 RX ADMIN — SODIUM CHLORIDE, POTASSIUM CHLORIDE, SODIUM LACTATE AND CALCIUM CHLORIDE: 600; 310; 30; 20 INJECTION, SOLUTION INTRAVENOUS at 13:39

## 2019-09-11 RX ADMIN — ACETAMINOPHEN 1000 MG: 500 TABLET, FILM COATED ORAL at 13:59

## 2019-09-11 RX ADMIN — FENTANYL CITRATE 50 MCG: 50 INJECTION, SOLUTION INTRAMUSCULAR; INTRAVENOUS at 15:45

## 2019-09-11 RX ADMIN — SODIUM CHLORIDE, POTASSIUM CHLORIDE, SODIUM LACTATE AND CALCIUM CHLORIDE: 600; 310; 30; 20 INJECTION, SOLUTION INTRAVENOUS at 16:12

## 2019-09-11 RX ADMIN — LIDOCAINE HYDROCHLORIDE 100 MG: 20 INJECTION, SOLUTION INFILTRATION; PERINEURAL at 15:05

## 2019-09-11 NOTE — OR NURSING
1617: To PACU from OR via gurney, respirations spontaneous and non-labored. Khadijah on right hand, cd&i.    1630: no change  1642: woke up, no c/o pain. O2 dc'd   1655: No change tolerating sips of water and juice  1705: Meets criteria for phase 2

## 2019-09-11 NOTE — ANESTHESIA PREPROCEDURE EVALUATION
Relevant Problems   CARDIAC   (+) SVT (supraventricular tachycardia) (HCC)       Physical Exam    Airway   Mallampati: II  TM distance: >3 FB  Neck ROM: full       Cardiovascular - normal exam  Rhythm: regular  Rate: normal  (-) murmur     Dental - normal exam         Pulmonary - normal exam  Breath sounds clear to auscultation     Abdominal    Neurological - normal exam                 Anesthesia Plan    ASA 1       Plan - general       Airway plan will be LMA  (Discussed possible postoperative brachial plexus block if needed for postop analgesia)      Induction: intravenous    Postoperative Plan: Postoperative administration of opioids is intended.    Pertinent diagnostic labs and testing reviewed    Informed Consent:    Anesthetic plan and risks discussed with patient.    Use of blood products discussed with: patient whom consented to blood products.

## 2019-09-11 NOTE — ANESTHESIA TIME REPORT
Anesthesia Start and Stop Event Times     Date Time Event    9/11/2019 1420 Ready for Procedure     1500 Anesthesia Start     1620 Anesthesia Stop        Responsible Staff  09/11/19    Name Role Begin End    Jose Greenberg M.D. Anesth 1500 1545    Joselin Newsome M.D. Anesth 1545 1620        Preop Diagnosis (Free Text):  Pre-op Diagnosis     TRAUMATIC RUPTURE OF EXTENSOR TENDON OF FINGER        Preop Diagnosis (Codes):    Post op Diagnosis  Injury of extensor tendon of hand      Premium Reason  A. 3PM - 7AM    Comments:

## 2019-09-12 NOTE — OR NURSING
1710- Patient to stage II from the PACU following Repair of tendon to patient left hand (small finger). Patient assisted with dressing by nursing staff and currently resting in chair. Patient denies the need for pain or nausea intervention at this time.     1716- Patient transportation for home present and to patient chair side.     1740- Discharge instructions for home discussed with patient and patient transportation for home.     1750- Patient discharged to awaiting vehicle via wheelchair without incident.

## 2019-09-12 NOTE — ANESTHESIA POSTPROCEDURE EVALUATION
Patient: Don Alejo    Procedure Summary     Date:  09/11/19 Room / Location:   OR  / SURGERY [facility]    Anesthesia Start:  1500 Anesthesia Stop:  1620    Procedure:  REPAIR, TENDON, EXTENSOR - SMALL FINGER CENTRAL SLIP REPAIR (Right ) Diagnosis:  (TRAUMATIC RUPTURE OF EXTENSOR TENDON OF FINGER)    Surgeon:  Lauri Ramesh M.D. Responsible Provider:  Joselin Newsome M.D.    Anesthesia Type:  general ASA Status:  1          Final Anesthesia Type: general  Last vitals  BP   Blood Pressure: 158/92, NIBP: 140/73    Temp   36.7 °C (98.1 °F)    Pulse   Pulse: 65, Heart Rate (Monitored): 69   Resp   16    SpO2   94 %      Anesthesia Post Evaluation    Patient location during evaluation: PACU  Patient participation: complete - patient participated  Level of consciousness: awake and alert    Airway patency: patent  Anesthetic complications: no  Cardiovascular status: hemodynamically stable  Respiratory status: acceptable  Hydration status: euvolemic    PONV: none           Nurse Pain Score: 0 (NPRS)

## 2019-09-12 NOTE — DISCHARGE INSTRUCTIONS
ACTIVITY: Rest and take it easy for the first 24 hours.  A responsible adult is recommended to remain with you during that time.  It is normal to feel sleepy.  We encourage you to not do anything that requires balance, judgment or coordination.    MILD FLU-LIKE SYMPTOMS ARE NORMAL. YOU MAY EXPERIENCE GENERALIZED MUSCLE ACHES, THROAT IRRITATION, HEADACHE AND/OR SOME NAUSEA.    FOR 24 HOURS DO NOT:  Drive, operate machinery or run household appliances.  Drink beer or alcoholic beverages.   Make important decisions or sign legal documents.    SPECIAL INSTRUCTIONS: Keep dressings clean, dry and intact   No lifting anything heavier than 2 lbs with the operative hand   Keep hand elevated and apply ice as much as possible in the first three days   Do not operate a vehicle or machinery while taking narcotic pain medications   Return to clinic in 10-14 days   Please call the office with any questions [phone]    DIET: To avoid nausea, slowly advance diet as tolerated, avoiding spicy or greasy foods for the first day.  Add more substantial food to your diet according to your physician's instructions.   INCREASE FLUIDS AND FIBER TO AVOID CONSTIPATION.      FOLLOW-UP APPOINTMENT:  A follow-up appointment should be arranged with your doctor,  call to schedule.    You should CALL YOUR PHYSICIAN if you develop:  Fever greater than 101 degrees F.  Pain not relieved by medication, or persistent nausea or vomiting.  Excessive bleeding (blood soaking through dressing) or unexpected drainage from the wound.  Extreme redness or swelling around the incision site, drainage of pus or foul smelling drainage.  Inability to urinate or empty your bladder within 8 hours.  Problems with breathing or chest pain.    You should call 911 if you develop problems with breathing or chest pain.  If you are unable to contact your doctor or surgical center, you should go to the nearest emergency room or urgent care center.  Dr Ramesh's telephone  #: [phone]    If any questions arise, call your doctor.  If your doctor is not available, please feel free to call the Surgical Center at [phone].  The Center is open Monday through Friday from 7AM to 7PM.  You can also call the HEALTH HOTLINE open 24 hours/day, 7 days/week and speak to a nurse at [phone], or toll free at [phone].    A registered nurse may call you a few days after your surgery to see how you are doing after your procedure.    MEDICATIONS: Resume taking daily medication.  Take prescribed pain medication with food.  If no medication is prescribed, you may take non-aspirin pain medication if needed.  PAIN MEDICATION CAN BE VERY CONSTIPATING.  Take a stool softener or laxative such as senokot, pericolace, or milk of magnesia if needed.    Prescription given for ULTRAM .  Last pain medication given at ____________________________________.    If your physician has prescribed pain medication that includes Acetaminophen (Tylenol), do not take additional Acetaminophen (Tylenol) while taking the prescribed medication.    Depression / Suicide Risk    As you are discharged from this [facility] facility, it is important to learn how to keep safe from harming yourself.    Recognize the warning signs:  · Abrupt changes in personality, positive or negative- including increase in energy   · Giving away possessions  · Change in eating patterns- significant weight changes-  positive or negative  · Change in sleeping patterns- unable to sleep or sleeping all the time   · Unwillingness or inability to communicate  · Depression  · Unusual sadness, discouragement and loneliness  · Talk of wanting to die  · Neglect of personal appearance   · Rebelliousness- reckless behavior  · Withdrawal from people/activities they love  · Confusion- inability to concentrate     If you or a loved one observes any of these behaviors or has concerns about self-harm, here's what you can do:  · Talk about it- your  feelings and reasons for harming yourself  · Remove any means that you might use to hurt yourself (examples: pills, rope, extension cords, firearm)  · Get professional help from the community (Mental Health, Substance Abuse, psychological counseling)  · Do not be alone:Call your Safe Contact- someone whom you trust who will be there for you.  · Call your local CRISIS HOTLINE 376-6869 or 864-187-7536  · Call your local Children's Mobile Crisis Response Team [place] (146) 569-8145 or www.EditGrid  · Call the toll free National Suicide Prevention Hotlines   · National Suicide Prevention Lifeline 749-125-JAYJ (2730)  · National Hope Line Network 800-SUICIDE (080-3416)

## 2019-09-18 NOTE — OP REPORT
DATE OF SERVICE:  09/11/2019    PREOPERATIVE DIAGNOSIS:  Right small finger central slip disruption.    POSTOPERATIVE DIAGNOSIS:  Right small finger central slip disruption.    SURGERY PERFORMED:  Right small finger central slip direct repair.    SURGEON:  Lauri Ramesh MD    ANESTHESIA:  General.    COMPLICATIONS:  None.    IMPLANTS:  Arthrex all-suture anchor.    INDICATIONS FOR PROCEDURE:  The patient is a pleasant young man who sustained   a right small finger central slip disruption off of the middle phalanx.  He   had early signs of a boutonniere deformity and for these reasons, the decision   was made to take him to the operating room today for the above-mentioned   procedure.    DESCRIPTION OF PROCEDURE:  On the day of surgery, the patient was seen in the   preoperative area where informed consent was obtained with all risks and   benefits of the procedure explained and all questions answered.  He wished to   proceed with the surgery.  The proper site was marked.  He was subsequently   taken to the operating room and placed in the supine position with all bony   prominences well padded.  General endotracheal anesthesia was induced.  A   tourniquet was placed on the right upper extremity, it was then prepped and   draped in the usual sterile fashion.    A timeout was performed with all persons in attendance agreeing on the proper   patient, proper surgical site, and proper surgery to be performed.    An Esmarch was used to exsanguinate the right upper extremity and the   tourniquet was insufflated to 250 mmHg.  A longitudinal incision was made   directly over the dorsal aspect of the PIP joint of the small finger.  Sharp   and blunt dissection was taken down to the level of the extensor tendon and   the central slip disruption was visualized.  The joint was thoroughly   irrigated with copious amounts of normal saline.  The frayed ends of the   tendon were debrided.    I then placed an Arthrex  all-suture anchor into the base of the middle   phalanx.  Fluoroscopy was used to verify acceptable positioning of this.  This   had good purchase and fixation into the base of the middle phalanx.  I then   used a Krackow type stitch to run along the ulnar border of the tendon and   then across to the radial border.  I then used the free end of the suture   anchor to help pull the tendon adjacent to the middle phalanx.  This had   excellent fixation.  This was then secured in place using surgical knots.    Free ends of the suture were then cut to length and removed.  I then performed   a repair of the lateral band on the radial aspect using 3-0 Vicryl suture in   a horizontal mattress fashion.  The wound was then thoroughly irrigated with   copious amounts of normal saline.  It was then repaired using 4-0 nylon in a   horizontal mattress fashion.  The wound was then dressed with Xeroform, 4x4,   sterile cast padding and a well-padded volar resting splint was then placed.    General endotracheal anesthesia was reversed.  The tourniquet was deflated.    He was then taken to the PACU in good and stable condition.     DISPOSITION:  He will be discharged home on a regular diet.  He will have a   1-pound lifting restriction of the right upper extremity.  He should apply ice   and elevate as much as possible for the next 10-14 days.  He was prescribed   narcotic pain medication and instructed not to drive or operate machinery   while taking this medication.  He will present to the occupational therapist   within 5 days postop where he will get a custom orthotic splint made and he   will begin active and passive range of motion protocols as prescribed.       ____________________________________     MD MAU Pollard / MARIANO    DD:  09/18/2019 11:29:51  DT:  09/18/2019 12:18:57    D#:  [id number]  Job#:  [id number]

## 2019-10-02 ENCOUNTER — OFFICE VISIT (OUTPATIENT)
Dept: CARDIOLOGY | Facility: MEDICAL CENTER | Age: 19
End: 2019-10-02
Payer: COMMERCIAL

## 2019-10-02 VITALS
OXYGEN SATURATION: 97 % | BODY MASS INDEX: 31.35 KG/M2 | WEIGHT: 219 LBS | SYSTOLIC BLOOD PRESSURE: 150 MMHG | DIASTOLIC BLOOD PRESSURE: 94 MMHG | HEART RATE: 71 BPM | HEIGHT: 70 IN

## 2019-10-02 DIAGNOSIS — I47.19 AVNRT (AV NODAL RE-ENTRY TACHYCARDIA): ICD-10-CM

## 2019-10-02 DIAGNOSIS — Z98.890 S/P CATHETER ABLATION OF SLOW PATHWAY: ICD-10-CM

## 2019-10-02 DIAGNOSIS — I47.10 SVT (SUPRAVENTRICULAR TACHYCARDIA): Primary | ICD-10-CM

## 2019-10-02 DIAGNOSIS — Z86.79 S/P CATHETER ABLATION OF SLOW PATHWAY: ICD-10-CM

## 2019-10-02 PROCEDURE — 99214 OFFICE O/P EST MOD 30 MIN: CPT | Performed by: NURSE PRACTITIONER

## 2019-10-02 PROCEDURE — 93000 ELECTROCARDIOGRAM COMPLETE: CPT | Performed by: INTERNAL MEDICINE

## 2019-10-02 ASSESSMENT — ENCOUNTER SYMPTOMS
SPUTUM PRODUCTION: 0
COUGH: 0
ORTHOPNEA: 0
ABDOMINAL PAIN: 0
VOMITING: 0
FOCAL WEAKNESS: 0
TREMORS: 0
CHILLS: 0
HEARTBURN: 0
DOUBLE VISION: 0
TINGLING: 0
NAUSEA: 0
WHEEZING: 0
DIARRHEA: 0
BLOOD IN STOOL: 0
BLURRED VISION: 0
SPEECH CHANGE: 0
DIZZINESS: 0
PSYCHIATRIC NEGATIVE: 1
PALPITATIONS: 0
SENSORY CHANGE: 0
PND: 0
HEADACHES: 0
HEMOPTYSIS: 0
FEVER: 0
WEIGHT LOSS: 0
STRIDOR: 0
LOSS OF CONSCIOUSNESS: 0
SORE THROAT: 0
SHORTNESS OF BREATH: 0

## 2019-10-02 NOTE — PROGRESS NOTES
Cardiology/Electrophysiology Follow-up Note      Subjective:   Chief Complaint:   Chief Complaint   Patient presents with   • Supraventricular Tachycardia (SVT)     PP       Don Alejo is a 19 y.o. male who presents today for hospital follow-up following SVT ablation by Dr. gonzalez 2019.    He is followed by Dr. Dr. Taveras/son for EP.  No significant past medical history except for recent extensor tendon repair.    Procedural Conclusions per Dr. Gonzalez's Op Note 19  Baseline Rhythm: sinus CL 1022 ms,  ms HV 49 ms. No pre-excitation.  AV Leida Function:  Wenckebach AV  ms   Initially no VA conduction, on isuprel infusion at 2 mcg/min, VA conduction resumed with VABCL of 300 ms   Arrhythmias:  1. During infusion of isoproterenol 2mcg/min, atrial pacing induced typical (slow/fast) AV leida reentrant tachycardia:   ms, HA<AH  Entrainment from the RVA showed VAHV response consistent with AVNRT  Mapping:  Electroanatomical 3D (CARTO3) map was created of right atrium and coronary sinus created during sinus rhythm to alma the position of the His, CS ostium, and the posteroseptal tricuspid annulus.   Ablation:  Radiofrequency energy was applied using 3.5 irrigated catheter, power 30 W, total 7 RF applications, RF time 171 seconds at the anatomic site of the slow pathway anterior to the CS ostium and just posterior to the septal tricuspid annulus, targeting ASP potential. Automaticity (accelerated junctional rhythm) with persistent retrograde fast pathway conduction to the atrium was seen during ablation.  Post Ablation Testin. No inducible AVNRT in baseline state or during Isuprel 4 mcg/min with single, double, and triple extra atrial stimuli pacing from HRA  Impressions:    1. Typical (slow/fast) AV leida reentrant tachycardia.  2. Successful catheter mediated ablation of slow AV leida pathway with elimination of AVNRT.    Today in follow up he tells me that he has done well post  ablation.  He denies any concerns or problems to address today.  He has not had any recurrence of palpitations fast heart rates presyncope or syncope.  .He denies chest pain, dizziness, dyspnea, PND, orthopnea, or lower extremity edema.      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      Past Medical History:   Diagnosis Date   • Arrhythmia 08/29/2019    SVT, ablation 8/29/2019 Dr. Anayeli Frye   • Paroxysmal supraventricular tachycardia (HCC)      Past Surgical History:   Procedure Laterality Date   • EXTENSOR TENDON REPAIR Right 9/11/2019    Procedure: REPAIR, TENDON, EXTENSOR - SMALL FINGER CENTRAL SLIP REPAIR;  Surgeon: Lauri Ramesh M.D.;  Location: SURGERY [facility];  Service: Orthopedics   • OTHER  08/29/2019    Cardiac ablation for SVT   • ACL RECONSTRUCTION Right 2017     History reviewed. No pertinent family history.  Social History     Socioeconomic History   • Marital status: Single     Spouse name: Not on file   • Number of children: Not on file   • Years of education: Not on file   • Highest education level: Not on file   Occupational History   • Not on file   Social Needs   • Financial resource strain: Not on file   • Food insecurity:     Worry: Not on file     Inability: Not on file   • Transportation needs:     Medical: Not on file     Non-medical: Not on file   Tobacco Use   • Smoking status: Never Smoker   • Smokeless tobacco: Never Used   Substance and Sexual Activity   • Alcohol use: Yes     Comment: reports 2/month   • Drug use: Not Currently   • Sexual activity: Not on file   Lifestyle   • Physical activity:     Days per week: Not on file     Minutes per session: Not on file   • Stress: Not on file   Relationships   • Social connections:     Talks on phone: Not on file     Gets together: Not on file     Attends [employer] service: Not on file     Active member of club or organization: Not on file     Attends meetings of clubs or  "organizations: Not on file     Relationship status: Not on file   • Intimate partner violence:     Fear of current or ex partner: Not on file     Emotionally abused: Not on file     Physically abused: Not on file     Forced sexual activity: Not on file   Other Topics Concern   • Not on file   Social History Narrative   • Not on file     No Known Allergies    Current Outpatient Medications   Medication Sig Dispense Refill   • ibuprofen (MOTRIN) 200 MG Tab Take 400 mg by mouth One-time as needed.       No current facility-administered medications for this visit.        Review of Systems   Constitutional: Negative for chills, fever, malaise/fatigue and weight loss.   HENT: Negative for congestion, nosebleeds, sore throat and tinnitus.    Eyes: Negative for blurred vision and double vision.   Respiratory: Negative for cough, hemoptysis, sputum production, shortness of breath, wheezing and stridor.    Cardiovascular: Negative for chest pain, palpitations, orthopnea, leg swelling and PND.   Gastrointestinal: Negative for abdominal pain, blood in stool, diarrhea, heartburn, melena, nausea and vomiting.   Genitourinary: Negative.    Musculoskeletal:        Finger injury, right hand    Skin: Negative for rash.   Neurological: Negative for dizziness, tingling, tremors, sensory change, speech change, focal weakness, loss of consciousness and headaches.   Psychiatric/Behavioral: Negative.      All others systems reviewed and negative.     Objective:     BP (!) 0/0 (BP Location: Left arm, Patient Position: Sitting, BP Cuff Size: Adult)   Ht 1.778 m (5' 10\")   Wt 99.3 kg (219 lb)  Body mass index is 31.42 kg/m².    Physical Exam   Constitutional: He is oriented to person, place, and time and well-developed, well-nourished, and in no distress.   HENT:   Head: Normocephalic and atraumatic.   Eyes: Pupils are equal, round, and reactive to light. Conjunctivae and EOM are normal.   Neck: Normal range of motion. Neck supple. No JVD " present. No tracheal deviation present.   Cardiovascular: Normal rate, regular rhythm, normal heart sounds and intact distal pulses. Exam reveals no gallop and no friction rub.   No murmur heard.  Pulses:       Radial pulses are 2+ on the right side, and 2+ on the left side.        Dorsalis pedis pulses are 2+ on the right side, and 2+ on the left side.        Posterior tibial pulses are 2+ on the right side, and 2+ on the left side.   Pulmonary/Chest: Effort normal and breath sounds normal. No respiratory distress. He has no wheezes. He has no rales. He exhibits no tenderness.   Abdominal: Soft. Bowel sounds are normal.   Musculoskeletal: Normal range of motion. He exhibits no edema.   Right hand splint    Neurological: He is alert and oriented to person, place, and time.   Skin: Skin is warm and dry.   Psychiatric: Mood, memory, affect and judgment normal.         Cardiac Imaging and Procedures Review:    EKG (10/2/19) reviewed by myself:   Sinus [facility]    Echo (8/8/19):   Left Ventricle  Normal left ventricular chamber size. Normal left ventricular wall   thickness.  Normal left ventricular systolic function. Left ventricular   ejection fraction is visually estimated to be 65%. Normal regional wall   motion. Normal diastolic function.    Right Ventricle  The right ventricle was normal in size and function.    Right Atrium  The right atrium is normal in size.  Normal inferior vena cava size and   inspiratory collapse.    Left Atrium  The left atrium is normal in size.    Mitral Valve  Structurally normal mitral valve without significant stenosis or   regurgitation.    Aortic Valve  Structurally normal aortic valve without significant stenosis or   regurgitation.    Tricuspid Valve  Structurally normal tricuspid valve without significant stenosis. Trace   tricuspid regurgitation. Unable to estimate pulmonary artery pressure   due to an inadequate tricuspid regurgitant jet.    Pulmonic Valve  Structurally normal  pulmonic valve without significant stenosis. Trace   pulmonic insufficiency.    Pericardium  Normal pericardium without effusion.    Aorta  The aortic root is normal.  Ascending aorta diameter is 2.2 cm.    Labs (personally reviewed and notable for):   Lab Results   Component Value Date/Time    SODIUM 137 08/28/2019 08:45 AM    POTASSIUM 4.3 08/28/2019 08:45 AM    CHLORIDE 103 08/28/2019 08:45 AM    CO2 27 08/28/2019 08:45 AM    GLUCOSE 84 08/28/2019 08:45 AM    BUN 14 08/28/2019 08:45 AM    CREATININE 1.21 08/28/2019 08:45 AM      Lab Results   Component Value Date/Time    WBC 5.6 08/28/2019 08:45 AM    RBC 5.91 08/28/2019 08:45 AM    HEMOGLOBIN 15.7 08/28/2019 08:45 AM    HEMATOCRIT 49.3 08/28/2019 08:45 AM    MCV 83.4 08/28/2019 08:45 AM    MCH 26.6 (L) 08/28/2019 08:45 AM    MCHC 31.8 (L) 08/28/2019 08:45 AM    MPV 9.5 08/28/2019 08:45 AM    NEUTSPOLYS 54.50 08/28/2019 08:45 AM    LYMPHOCYTES 33.00 08/28/2019 08:45 AM    MONOCYTES 10.60 08/28/2019 08:45 AM    EOSINOPHILS 1.10 08/28/2019 08:45 AM    BASOPHILS 0.40 08/28/2019 08:45 AM      PT/INR:   Lab Results   Component Value Date/Time    PROTHROMBTM 13.3 08/28/2019 08:45 AM    INR 0.99 08/28/2019 08:45 AM   ]      Assessment:     1. SVT (supraventricular tachycardia) (HCC)  EKG   2. AVNRT (AV ld re-entry tachycardia) ([facility])     3. S/P catheter ablation of slow pathway         Medical Decision Making:  Today's Assessment / Status / Plan:   1.  SVT status post ablation of AVNRT/slow pathway:  -He is clinically doing well postprocedure without recurrence of elevated heart rate or palpitations.  He has not had any symptoms post procedurally.  His twelve-lead EKG shows sinus rhythm at a rate of 71 tracings are similar to previous sinus rhythm EKGs.  -His blood pressure is borderline elevated today on the right arm 138/90.  His previous blood pressure readings have been normal I have discussed with him trying to reduce sodium from his diet and will recheck on his  next follow-up.    Plan reviewed in detail with the patient and he verbalizes understanding and is in agreement.   RTC in 3 months for reivew, sooner if clinical condition changes  Collaborating MD/ADD: DIVINE Medrano.

## 2019-10-03 LAB — EKG IMPRESSION: NORMAL
